# Patient Record
Sex: MALE | Race: BLACK OR AFRICAN AMERICAN
[De-identification: names, ages, dates, MRNs, and addresses within clinical notes are randomized per-mention and may not be internally consistent; named-entity substitution may affect disease eponyms.]

---

## 2019-01-29 ENCOUNTER — HOSPITAL ENCOUNTER (EMERGENCY)
Dept: HOSPITAL 72 - EMR | Age: 39
Discharge: HOME | End: 2019-01-29
Payer: SELF-PAY

## 2019-01-29 VITALS — HEIGHT: 69 IN | WEIGHT: 189 LBS | BODY MASS INDEX: 27.99 KG/M2

## 2019-01-29 VITALS — SYSTOLIC BLOOD PRESSURE: 130 MMHG | DIASTOLIC BLOOD PRESSURE: 89 MMHG

## 2019-01-29 VITALS — DIASTOLIC BLOOD PRESSURE: 89 MMHG | SYSTOLIC BLOOD PRESSURE: 130 MMHG

## 2019-01-29 DIAGNOSIS — I10: ICD-10-CM

## 2019-01-29 DIAGNOSIS — H00.016: Primary | ICD-10-CM

## 2019-01-29 PROCEDURE — 99282 EMERGENCY DEPT VISIT SF MDM: CPT

## 2019-01-29 NOTE — NUR
ED Nurse Note:



patient walked in from home c/o left eye swelling, appears to be sty, started 
last Friday.

He went to see a doctor for this and taking Bactrim for three days but with no 
improvement.

## 2019-01-29 NOTE — EMERGENCY ROOM REPORT
History of Present Illness


General


Chief Complaint:  Skin Rash/Abscess


Source:  Patient





Present Illness


HPI


Patient presents emergency department today complaining of a sty on the left 

eye.  Patient states that he was diagnosed with a sty couple days ago.  He has 

been taking Bactrim orally.  However he was not given any topical antibiotics.  

He denies any fever chest pain shortness of breath.  He states that the eyelid 

does appear to be more swollen.  No other complaints are noted.  Symptoms noted 

to be moderate. No other modifying factors.  No other associated signs and 

symptoms.  No other complaints were noted.


Allergies:  


Coded Allergies:  


     TRAMADOL (Verified  Allergy, Unknown, 1/29/19)





Patient History


Past Medical History:  HTN


Past Surgical History:  none


Pertinent Family History:  none


Social History:  Denies: smoking, alcohol use, drug use


Reviewed Nursing Documentation:  PMH: Agreed; PSxH: Agreed





Nursing Documentation-PMH


Past Medical History:  No History, Except For


Hx Hypertension:  Yes





Review of Systems


All Other Systems:  negative except mentioned in HPI





Physical Exam





Vital Signs








  Date Time  Temp Pulse Resp B/P (MAP) Pulse Ox O2 Delivery O2 Flow Rate FiO2


 


1/29/19 07:53 98.4 82 16 130/89 94 Room Air  








Sp02 EP Interpretation:  reviewed, normal


General Appearance:  normal inspection, well appearing, no apparent distress, 

alert


Head:  atraumatic


Eyes:  left eye PERRL, left eye other - Sty, Eyelid swell


ENT:  normal ENT inspection, hearing grossly normal, normal voice


Neck:  normal inspection, full range of motion, supple, no bony tend


Respiratory:  normal inspection, lungs clear, normal breath sounds, no 

respiratory distress, no retraction, no wheezing


Cardiovascular #1:  regular rate, rhythm, no edema


Gastrointestinal:  normal inspection, normal bowel sounds, non tender, soft, no 

guarding, no hernia


Genitourinary:  no CVA tenderness


Musculoskeletal:  normal inspection, back normal, normal range of motion


Neurologic:  normal inspection, alert, responsive, speech normal


Psychiatric:  normal inspection, judgement/insight normal, mood/affect normal


Skin:  normal inspection, normal color, no rash





Medical Decision Making


Diagnostic Impression:  


 Primary Impression:  


 Stye external


ER Course


Patient presents emergency department today complaining of a stye in the left 

eye.  Differential considerations include cellulitis, stye, abscess just name a 

few.  Patient's exam is consistent with slight and mild cellulitis of the 

eyelid.  I felt the patient could be treated with topical ointment 

erythromycin.  Patient was also given additional Keflex.  Recommend outpatient 

follow-up with ophthalmology.Patient is advised to follow up with primary 

doctor in 2-3 days and return the emergency room for any worsening symptoms and 

as needed.





Last Vital Signs








  Date Time  Temp Pulse Resp B/P (MAP) Pulse Ox O2 Delivery O2 Flow Rate FiO2


 


1/29/19 08:52 98.4 82 18 130/89 98 Room Air  








Status:  improved


Disposition:  HOME, SELF-CARE


Condition:  Stable


Scripts


Erythromycin Base (ERYTHROMYCIN*) 3.5 Gm Oint...g.


1 APPLIC LEFT EYE QID for 7 Days, #3.5 GM 0 Refills


   Prov: Chu Rogers MD         1/29/19 


Cephalexin* (KEFLEX*) 500 Mg Capsule


500 MG ORAL EVERY 6 HOURS for 7 Days, CAP


   Prov: Chu Rogers MD         1/29/19


Referrals:  


NON PHYSICIAN (PCP)


Departure Forms:  Return to School,    Return to School On:  Feb 4, 2019


   School Release Restrictions:  None


      Return to Work      Return to Work Date:  Feb 4, 2019


Patient Instructions:  Chu Guo MD Jan 29, 2019 15:49

## 2019-01-29 NOTE — NUR
ED Nurse Note:



pt was cleared for discharge by ERMD, discharge instrcution/paper/prescription  
explained and patient  verbalized understanding. ID band removed. pt aox4. pt 
able to walk with steady gait. pt left the ed with all belongings.

## 2019-06-29 ENCOUNTER — HOSPITAL ENCOUNTER (EMERGENCY)
Dept: HOSPITAL 72 - EMR | Age: 39
Discharge: HOME | End: 2019-06-29
Payer: MEDICAID

## 2019-06-29 VITALS — HEIGHT: 69.5 IN | BODY MASS INDEX: 27.65 KG/M2 | WEIGHT: 191 LBS

## 2019-06-29 VITALS — SYSTOLIC BLOOD PRESSURE: 132 MMHG | DIASTOLIC BLOOD PRESSURE: 86 MMHG

## 2019-06-29 VITALS — DIASTOLIC BLOOD PRESSURE: 92 MMHG | SYSTOLIC BLOOD PRESSURE: 136 MMHG

## 2019-06-29 DIAGNOSIS — L02.215: Primary | ICD-10-CM

## 2019-06-29 DIAGNOSIS — I10: ICD-10-CM

## 2019-06-29 DIAGNOSIS — Z88.6: ICD-10-CM

## 2019-06-29 LAB
ADD MANUAL DIFF: NO
ALBUMIN SERPL-MCNC: 4.3 G/DL (ref 3.4–5)
ALBUMIN/GLOB SERPL: 1.3 {RATIO} (ref 1–2.7)
ALP SERPL-CCNC: 84 U/L (ref 46–116)
ALT SERPL-CCNC: 23 U/L (ref 12–78)
ANION GAP SERPL CALC-SCNC: 8 MMOL/L (ref 5–15)
APPEARANCE UR: CLEAR
APTT PPP: YELLOW S
AST SERPL-CCNC: 17 U/L (ref 15–37)
BASOPHILS NFR BLD AUTO: 0.8 % (ref 0–2)
BILIRUB SERPL-MCNC: 0.5 MG/DL (ref 0.2–1)
BUN SERPL-MCNC: 11 MG/DL (ref 7–18)
CALCIUM SERPL-MCNC: 9.4 MG/DL (ref 8.5–10.1)
CHLORIDE SERPL-SCNC: 104 MMOL/L (ref 98–107)
CO2 SERPL-SCNC: 28 MMOL/L (ref 21–32)
CREAT SERPL-MCNC: 1 MG/DL (ref 0.55–1.3)
EOSINOPHIL NFR BLD AUTO: 2.2 % (ref 0–3)
ERYTHROCYTE [DISTWIDTH] IN BLOOD BY AUTOMATED COUNT: 12.5 % (ref 11.6–14.8)
GLOBULIN SER-MCNC: 3.2 G/DL
GLUCOSE UR STRIP-MCNC: NEGATIVE MG/DL
HCT VFR BLD CALC: 39.5 % (ref 42–52)
HGB BLD-MCNC: 12.3 G/DL (ref 14.2–18)
KETONES UR QL STRIP: (no result)
LEUKOCYTE ESTERASE UR QL STRIP: (no result)
LYMPHOCYTES NFR BLD AUTO: 35.7 % (ref 20–45)
MCV RBC AUTO: 77 FL (ref 80–99)
MONOCYTES NFR BLD AUTO: 11.8 % (ref 1–10)
NEUTROPHILS NFR BLD AUTO: 49.4 % (ref 45–75)
NITRITE UR QL STRIP: NEGATIVE
PH UR STRIP: 6 [PH] (ref 4.5–8)
PLATELET # BLD: 298 K/UL (ref 150–450)
POTASSIUM SERPL-SCNC: 4.1 MMOL/L (ref 3.5–5.1)
PROT UR QL STRIP: NEGATIVE
RBC # BLD AUTO: 5.13 M/UL (ref 4.7–6.1)
SODIUM SERPL-SCNC: 140 MMOL/L (ref 136–145)
SP GR UR STRIP: 1.01 (ref 1–1.03)
UROBILINOGEN UR-MCNC: 4 MG/DL (ref 0–1)
WBC # BLD AUTO: 8 K/UL (ref 4.8–10.8)

## 2019-06-29 PROCEDURE — 96360 HYDRATION IV INFUSION INIT: CPT

## 2019-06-29 PROCEDURE — 83690 ASSAY OF LIPASE: CPT

## 2019-06-29 PROCEDURE — 85025 COMPLETE CBC W/AUTO DIFF WBC: CPT

## 2019-06-29 PROCEDURE — 87040 BLOOD CULTURE FOR BACTERIA: CPT

## 2019-06-29 PROCEDURE — 80053 COMPREHEN METABOLIC PANEL: CPT

## 2019-06-29 PROCEDURE — 81003 URINALYSIS AUTO W/O SCOPE: CPT

## 2019-06-29 PROCEDURE — 74177 CT ABD & PELVIS W/CONTRAST: CPT

## 2019-06-29 PROCEDURE — 99284 EMERGENCY DEPT VISIT MOD MDM: CPT

## 2019-06-29 PROCEDURE — 36415 COLL VENOUS BLD VENIPUNCTURE: CPT

## 2019-06-29 NOTE — DIAGNOSTIC IMAGING REPORT
ADDENDUM - Added by Brianna Wiggins M.D. on 6/29/2019 3:55 PM (-07:00)

ADDITIONAL FINDINGS:

 

Small left perineal abscess measuring approximately 0.9 x 1.6 x 1.4 cm.

----------------------------------------------------------------------

 

EXAM:

  CT Abdomen and Pelvis With Intravenous Contrast

 

CLINICAL HISTORY:

  PAIN

 

TECHNIQUE:

  Axial computed tomography images of the abdomen and pelvis with 

intravenous contrast.  CTDI is 13.45 mGy and DLP is 776.52 mGy-cm.  One 

or more of the following dose reduction techniques were used: automated 

exposure control, adjustment of the mA and/or kV according to patient 

size, use of iterative reconstruction technique.

 

COMPARISON:

  None

 

FINDINGS:

  Liver: Normal. No focal lesion.

 

  Spleen: Normal. No focal lesion.

 

  Gallbladder: Normal. No stones or biliary dilatation.

 

  Pancreas: Normal. No acute inflammation. No mass.

 

  Adrenal glands: Normal. No mass.

 

  Kidneys: Normal. No hydronephrosis or stone. No mass.

 

  Bowel: Normal appendix.  Fluid and gas-filled small bowel loops are 

nonspecific but could represent enteritis or ileus in the appropriate 

clinical setting. No bowel obstruction or inflammation.

 

  Urinary bladder: Normal. No wall thickening or mass.

 

  Reproductive organs: Normal.

 

  Muscles: No mass.

 

  Subcutaneous tissues: Normal.

 

  Peritoneal space: Normal. No free fluid.

 

  Lymph nodes: Nonspecific mildly prominent inguinal lymph nodes.

 

  Vessels: Minimal atherosclerotic calcification. No aneurysm or 

dissection.

 

  Bones: No acute fracture.  Probable small bone islands in the pelvic 

bones.

 

  Lung bases: No acute disease.

 

IMPRESSION:   

  Fluid and gas-filled small bowel loops are nonspecific but could 

represent enteritis or ileus in the appropriate clinical setting.

 

  No other acute abnormality in the abdomen or pelvis.

 

<MYCVCSECTION>

 

[employer]

 

06/29/19 15:53 Call From Berta Lacy MD on 06/29 15:53 (-07:00)

## 2019-06-29 NOTE — EMERGENCY ROOM REPORT
History of Present Illness


General


Chief Complaint:  Abdominal Pain


Source:  Patient


 (GatoRick KLEIN)





Present Illness


HPI


Patient presents with complaints of pain and discomfort to the left lower 

inguinal groin pain


Patient reports that on Thursday he started having some discomfort today during 

training he


Again started feeling heaviness with standing or movement





Denies any chest pain denies any vomiting patient reports that he was not able 

to have a full bowel movement this morning


However denies any constipation





Denies any rash


Patient points to the lower aspect of the scrotal area with radiation down 

towards the rectal region


 (Rick Hoskins DO)


Allergies:  


Coded Allergies:  


     TRAMADOL (Verified  Allergy, Unknown, 1/29/19)





Patient History


Past Medical History:  see triage record


Pertinent Family History:  none


Reviewed Nursing Documentation:  PMH: Agreed; PSxH: Agreed (Rick Hoskins DO)





Nursing Documentation-PMH


Past Medical History:  No History, Except For


Hx Hypertension:  Yes


 (Rick Hoskins DO)





Review of Systems


All Other Systems:  negative except mentioned in HPI


 (Rick Hoskins DO)





Physical Exam





Vital Signs








  Date Time  Temp Pulse Resp B/P (MAP) Pulse Ox O2 Delivery O2 Flow Rate FiO2


 


6/29/19 13:16 98.4 92 18 140/96 (111) 96 Room Air  








Sp02 EP Interpretation:  reviewed, normal


General Appearance:  well appearing, no apparent distress


Head:  normocephalic, atraumatic


Eyes:  bilateral eye PERRL, bilateral eye EOMI


ENT:  normal pharynx


Neck:  supple


Respiratory:  lungs clear, no retraction, no accessory muscle use


Cardiovascular #1:  regular rate, rhythm


Gastrointestinal:  non tender, soft


Rectal:  other - There is a fluctuance palpable area just at the base of the 

left lower scrotal region the area of fullness appears to track towards the 

region above the rectal area, no obvious open fissures


Musculoskeletal:  back normal


Neurologic:  alert, oriented x3


Skin:  other - As above


Lymphatic:  no adenopathy


 (Rick Hoskins DO)





Medical Decision Making


Diagnostic Impression:  


 Primary Impression:  


 Perineal abscess, superficial


ER Course


Given the above history exam multiple differentials and consideration including 

but not limited to


Folliculitis, perirectal abscess





Patient's testicular exam is benign, the area in question appears to be below 

the scrotal area and not involving the scrotum


CT imaging is obtained


 (Rick Hoskins DO)


ER Course


Patient was endorsed to me after increased discomfort to his left perineal area 

between his penis and anus.  Differential diagnosis include was not limited to 

abscess, cyst among others.  CT imaging read by radiology showed cystic area 

fluid-filled area consistent with a cyst or abscess.  Patient was offered 

needle aspiration incision and drainage.  Patient declined incision at this 

time.  Patient was advised to have the area rechecked in 2 days.  Patient was 

advised to follow-up with surgery for recheck





Labs








Test


  6/29/19


13:30 6/29/19


13:51


 


White Blood Count


  8.0 K/UL


(4.8-10.8) 


 


 


Red Blood Count


  5.13 M/UL


(4.70-6.10) 


 


 


Hemoglobin


  12.3 G/DL


(14.2-18.0) 


 


 


Hematocrit


  39.5 %


(42.0-52.0) 


 


 


Mean Corpuscular Volume 77 FL (80-99)  


 


Mean Corpuscular Hemoglobin


  24.0 PG


(27.0-31.0) 


 


 


Mean Corpuscular Hemoglobin


Concent 31.1 G/DL


(32.0-36.0) 


 


 


Red Cell Distribution Width


  12.5 %


(11.6-14.8) 


 


 


Platelet Count


  298 K/UL


(150-450) 


 


 


Mean Platelet Volume


  5.5 FL


(6.5-10.1) 


 


 


Neutrophils (%) (Auto)


  49.4 %


(45.0-75.0) 


 


 


Lymphocytes (%) (Auto)


  35.7 %


(20.0-45.0) 


 


 


Monocytes (%) (Auto)


  11.8 %


(1.0-10.0) 


 


 


Eosinophils (%) (Auto)


  2.2 %


(0.0-3.0) 


 


 


Basophils (%) (Auto)


  0.8 %


(0.0-2.0) 


 


 


Sodium Level


  140 MMOL/L


(136-145) 


 


 


Potassium Level


  4.1 MMOL/L


(3.5-5.1) 


 


 


Chloride Level


  104 MMOL/L


() 


 


 


Carbon Dioxide Level


  28 MMOL/L


(21-32) 


 


 


Anion Gap


  8 mmol/L


(5-15) 


 


 


Blood Urea Nitrogen


  11 mg/dL


(7-18) 


 


 


Creatinine


  1.0 MG/DL


(0.55-1.30) 


 


 


Estimat Glomerular Filtration


Rate > 60 mL/min


(>60) 


 


 


Glucose Level


  90 MG/DL


() 


 


 


Calcium Level


  9.4 MG/DL


(8.5-10.1) 


 


 


Total Bilirubin


  0.5 MG/DL


(0.2-1.0) 


 


 


Aspartate Amino Transf


(AST/SGOT) 17 U/L (15-37) 


  


 


 


Alanine Aminotransferase


(ALT/SGPT) 23 U/L (12-78) 


  


 


 


Alkaline Phosphatase


  84 U/L


() 


 


 


Total Protein


  7.5 G/DL


(6.4-8.2) 


 


 


Albumin


  4.3 G/DL


(3.4-5.0) 


 


 


Globulin 3.2 g/dL  


 


Albumin/Globulin Ratio 1.3 (1.0-2.7)  


 


Lipase


  63 U/L


() 


 


 


Urine Color  Yellow 


 


Urine Appearance  Clear 


 


Urine pH  6 (4.5-8.0) 


 


Urine Specific Gravity


  


  1.015


(1.005-1.035)


 


Urine Protein


  


  Negative


(NEGATIVE)


 


Urine Glucose (UA)


  


  Negative


(NEGATIVE)


 


Urine Ketones  1+ (NEGATIVE) 


 


Urine Blood


  


  Negative


(NEGATIVE)


 


Urine Nitrite


  


  Negative


(NEGATIVE)


 


Urine Bilirubin


  


  Negative


(NEGATIVE)


 


Urine Urobilinogen


  


  4 MG/DL


(0.0-1.0)


 


Urine Leukocyte Esterase  1+ (NEGATIVE) 


 


Urine RBC  0 /HPF (0 - 0) 


 


Urine WBC


  


  0-2 /HPF (0 -


0)


 


Urine Squamous Epithelial


Cells 


  Occasional


/LPF


 


Urine Bacteria


  


  Occasional


/HPF (NONE)


 


Urine Mucus


  


  Few /LPF


(NONE/OCC)








 (Rainer Lacy MD)





Last Vital Signs








  Date Time  Temp Pulse Resp B/P (MAP) Pulse Ox O2 Delivery O2 Flow Rate FiO2


 


6/29/19 13:25  86 16   Room Air  


 


6/29/19 13:25 98.2   136/92 98   








 (Rick Hoskins DO)


Status:  improved


 (Rainer Lacy MD)


Disposition:  HOME, SELF-CARE


Condition:  Stable


Scripts


Trimethoprim/Sulfamethoxazole 160/800* (BACTRIM DS TABLET*) 1 Each Tablet


1 TAB ORAL Q12H, #14 TAB 0 Refills


   Prov: Rainer Lacy MD         6/29/19 


Cephalexin* (KEFLEX*) 500 Mg Capsule


500 MG ORAL EVERY 6 HOURS, #28 CAP


   Prov: Rainer Lacy MD         6/29/19











Rick Hoskins DO Jun 29, 2019 13:45


Rainer Lacy MD Jun 29, 2019 16:47

## 2019-06-29 NOTE — NUR
ED Nurse Note:



PT WALKED IN TO ER TODAY FROM HOME. AOX4. PT C/O LLQ ABDOMINAL PAIN, 3/10 AT 
REST X 2 DAYS AGO. PT STATES HE NOTICED A "LUMP" NEAR POSTERIOR SCROTAL SAC X 
YESTERDAY. PT DENIES NAUSEA, VOMITING, OR DIARRHEA. ACTIVE BOWEL SOUNDS IN ALL 
QUADRANTS. ABDOMEN NONDISTENDED AND NONTENDER TO PALPATION. LAST BM X 2 DAYS 
AGO WHICH PT STATES WAS FORMED.